# Patient Record
Sex: MALE | Race: WHITE | NOT HISPANIC OR LATINO | RURAL
[De-identification: names, ages, dates, MRNs, and addresses within clinical notes are randomized per-mention and may not be internally consistent; named-entity substitution may affect disease eponyms.]

---

## 2022-03-08 ENCOUNTER — OFFICE VISIT (OUTPATIENT)
Dept: RURAL CLINIC 6 | Facility: CLINIC | Age: 62
End: 2022-03-08
Payer: MEDICARE

## 2022-03-08 ENCOUNTER — WEB ENCOUNTER (OUTPATIENT)
Dept: RURAL CLINIC 6 | Facility: CLINIC | Age: 62
End: 2022-03-08

## 2022-03-08 DIAGNOSIS — R74.8 ABNORMAL LIVER ENZYMES: ICD-10-CM

## 2022-03-08 DIAGNOSIS — R16.0 HEPATOMEGALY: ICD-10-CM

## 2022-03-08 PROCEDURE — 99204 OFFICE O/P NEW MOD 45 MIN: CPT | Performed by: INTERNAL MEDICINE

## 2022-03-08 RX ORDER — BESIFLOXACIN 6 MG/ML
1 DROP INTO AFFECTED EYE SUSPENSION OPHTHALMIC THREE TIMES A DAY
Status: DISCONTINUED | COMMUNITY

## 2022-03-08 RX ORDER — OMEPRAZOLE 20 MG/1
1 CAPSULE 30 MINUTES BEFORE MORNING MEAL CAPSULE, DELAYED RELEASE ORAL ONCE A DAY
Status: ACTIVE | COMMUNITY

## 2022-03-08 RX ORDER — PREDNISOLONE ACETATE 10 MG/ML
1 DROP INTO AFFECTED EYE SUSPENSION/ DROPS OPHTHALMIC TWICE A DAY
Status: ACTIVE | COMMUNITY

## 2022-03-08 RX ORDER — GABAPENTIN 300 MG/1
1 CAPSULE CAPSULE ORAL ONCE A DAY
Status: ACTIVE | COMMUNITY

## 2022-03-08 RX ORDER — AMLODIPINE BESYLATE 10 MG/1
1 TABLET TABLET ORAL ONCE A DAY
Status: ACTIVE | COMMUNITY

## 2022-03-08 RX ORDER — NALOXONE HYDROCHLORIDE 4 MG/.1ML
AS DIRECTED SPRAY NASAL
Status: DISCONTINUED | COMMUNITY

## 2022-03-08 RX ORDER — OXYCODONE HYDROCHLORIDE AND ACETAMINOPHEN 5; 325 MG/1; MG/1
1 TABLET AS NEEDED TABLET ORAL
Status: DISCONTINUED | COMMUNITY

## 2022-03-08 RX ORDER — OLMESARTAN MEDOXOMIL 40 MG/1
1 TABLET TABLET, FILM COATED ORAL ONCE A DAY
Status: ACTIVE | COMMUNITY

## 2022-03-08 NOTE — HPI-TODAY'S VISIT:
This is a 61-year-old male referred for abnormal liver profile.  He was aware of abnormal liver profile about a year and a half ago for the first time.  This is consisted of an AST greater than ALT and mildly elevated alkaline phosphatase and a bilirubin of 2.1 as well as ultrasound showing a trace of ascites and heterogeneous appearance of the liver.  He has no history of blood transfusions or blood products exposure to hepatitis needlesticks or tattoos.  He has not been around anyone with chronic viral hepatitis.  No family members have chronic hepatic disease.  He has consumed alcohol fairly heavily for many years.  He currently estimates drinking 3 vodka drinks daily and occasionally bloody Arielle in the morning.  He has no peripheral edema and no history of heart disease.  Hx chronic foot pain/narcotics   Hx esophageal stricture dilation 2020, no varices seen, no dysphagia now.

## 2022-03-08 NOTE — PHYSICAL EXAM GASTROINTESTINAL
Abdomen , soft, nontender, nondistended , no guarding or rigidity , no masses palpable , normal bowel sounds , Liver and Spleen , firm hepatomegaly, nodular , 10 cm below RCM , liver nontender , spleen not palpable . Prominent periumbilical veins. No ascites.

## 2022-05-03 ENCOUNTER — OFFICE VISIT (OUTPATIENT)
Dept: RURAL CLINIC 6 | Facility: CLINIC | Age: 62
End: 2022-05-03
Payer: MEDICARE

## 2022-05-03 DIAGNOSIS — R74.8 ABNORMAL LIVER ENZYMES: ICD-10-CM

## 2022-05-03 DIAGNOSIS — R16.0 HEPATOMEGALY: ICD-10-CM

## 2022-05-03 DIAGNOSIS — K74.60 HEPATIC CIRRHOSIS, UNSPECIFIED HEPATIC CIRRHOSIS TYPE, UNSPECIFIED WHETHER ASCITES PRESENT: ICD-10-CM

## 2022-05-03 PROBLEM — 166643006: Status: ACTIVE | Noted: 2022-03-08

## 2022-05-03 PROBLEM — 80515008: Status: ACTIVE | Noted: 2022-03-08

## 2022-05-03 PROCEDURE — 99213 OFFICE O/P EST LOW 20 MIN: CPT | Performed by: INTERNAL MEDICINE

## 2022-05-03 RX ORDER — OLMESARTAN MEDOXOMIL 40 MG/1
1 TABLET TABLET, FILM COATED ORAL ONCE A DAY
Status: ACTIVE | COMMUNITY

## 2022-05-03 RX ORDER — OXYCODONE HYDROCHLORIDE 5 MG/1
1 TABLET AS NEEDED TABLET ORAL
Status: ACTIVE | COMMUNITY

## 2022-05-03 RX ORDER — GABAPENTIN 300 MG/1
1 CAPSULE CAPSULE ORAL ONCE A DAY
Status: ACTIVE | COMMUNITY

## 2022-05-03 RX ORDER — AMLODIPINE BESYLATE 10 MG/1
1 TABLET TABLET ORAL ONCE A DAY
Status: ACTIVE | COMMUNITY

## 2022-05-03 RX ORDER — OMEPRAZOLE 20 MG/1
1 CAPSULE 30 MINUTES BEFORE MORNING MEAL CAPSULE, DELAYED RELEASE ORAL ONCE A DAY
Status: ACTIVE | COMMUNITY

## 2022-05-03 RX ORDER — PREDNISOLONE ACETATE 10 MG/ML
1 DROP INTO AFFECTED EYE SUSPENSION/ DROPS OPHTHALMIC TWICE A DAY
Status: DISCONTINUED | COMMUNITY

## 2022-05-03 NOTE — HPI-TODAY'S VISIT:
This is a 62-year-old male seen in follow-up of hepatomegaly and chronic liver disease.  He had been consuming alcohol on a daily basis but is not drank any alcohol in the last several months after our last visit.  His prior ultrasound revealed changes suggestive of cirrhosis and a small amount of ascites.  His FibroSure revealed F4 fibrosis and steatosis.  Labs were negative for hemochromatosis Froylan disease hepatitis B or hepatitis C.  He has immunity to hepatitis B with HBsAb positive.  He feels well with no specific complaints today.

## 2022-05-03 NOTE — PHYSICAL EXAM GASTROINTESTINAL
Abdomen , soft, nontender, nondistended , no guarding or rigidity , no masses palpable , normal bowel sounds , Liver and Spleen , firm hepatomegaly present 6 cm below RCM , no splenomegaly , liver nontender , spleen not palpable

## 2022-08-24 LAB
A/G RATIO: 1.6
AFP, SERUM, TUMOR MARKER: 4.8
ALBUMIN: 4.5
ALKALINE PHOSPHATASE: 500
ALT (SGPT): 25
AST (SGOT): 55
BASO (ABSOLUTE): 0.2
BASOS: 4
BILIRUBIN, TOTAL: 1.5
BUN/CREATININE RATIO: 15
BUN: 18
CALCIUM: 9.7
CARBON DIOXIDE, TOTAL: 19
CHLORIDE: 100
CREATININE: 1.24
EGFR: 65
EOS (ABSOLUTE): 0.2
EOS: 4
GLOBULIN, TOTAL: 2.9
GLUCOSE: 101
HEMATOCRIT: 40.9
HEMATOLOGY COMMENTS:: (no result)
HEMOGLOBIN: 13.9
IMMATURE CELLS: (no result)
IMMATURE GRANS (ABS): 0
IMMATURE GRANULOCYTES: 0
INR: 1.1
LYMPHS (ABSOLUTE): 2
LYMPHS: 35
MCH: 34.2
MCHC: 34
MCV: 101
MONOCYTES(ABSOLUTE): 0.6
MONOCYTES: 11
NEUTROPHILS (ABSOLUTE): 2.7
NEUTROPHILS: 46
NRBC: (no result)
PLATELETS: 149
POTASSIUM: 4.4
PROTEIN, TOTAL: 7.4
PROTHROMBIN TIME: 11.1
RBC: 4.07
RDW: 13
SODIUM: 135
WBC: 5.7

## 2022-08-25 LAB
HEP A AB, TOTAL: NEGATIVE
WRITTEN AUTHORIZATION: (no result)

## 2022-09-03 ENCOUNTER — LAB OUTSIDE AN ENCOUNTER (OUTPATIENT)
Dept: RURAL CLINIC 6 | Facility: CLINIC | Age: 62
End: 2022-09-03

## 2022-09-06 ENCOUNTER — LAB OUTSIDE AN ENCOUNTER (OUTPATIENT)
Dept: RURAL CLINIC 6 | Facility: CLINIC | Age: 62
End: 2022-09-06

## 2022-09-06 ENCOUNTER — OFFICE VISIT (OUTPATIENT)
Dept: RURAL CLINIC 6 | Facility: CLINIC | Age: 62
End: 2022-09-06
Payer: MEDICARE

## 2022-09-06 VITALS
WEIGHT: 174 LBS | HEIGHT: 72 IN | BODY MASS INDEX: 23.57 KG/M2 | TEMPERATURE: 98 F | DIASTOLIC BLOOD PRESSURE: 85 MMHG | HEART RATE: 98 BPM | SYSTOLIC BLOOD PRESSURE: 142 MMHG

## 2022-09-06 DIAGNOSIS — K74.60 HEPATIC CIRRHOSIS, UNSPECIFIED HEPATIC CIRRHOSIS TYPE, UNSPECIFIED WHETHER ASCITES PRESENT: ICD-10-CM

## 2022-09-06 PROBLEM — 19943007: Status: ACTIVE | Noted: 2022-05-03

## 2022-09-06 PROCEDURE — 99214 OFFICE O/P EST MOD 30 MIN: CPT | Performed by: INTERNAL MEDICINE

## 2022-09-06 RX ORDER — OLMESARTAN MEDOXOMIL 40 MG/1
1 TABLET TABLET, FILM COATED ORAL ONCE A DAY
Status: ACTIVE | COMMUNITY

## 2022-09-06 RX ORDER — AMLODIPINE BESYLATE 10 MG/1
1 TABLET TABLET ORAL ONCE A DAY
Status: ACTIVE | COMMUNITY

## 2022-09-06 RX ORDER — OXYCODONE HYDROCHLORIDE 5 MG/1
1 TABLET AS NEEDED TABLET ORAL
Status: ACTIVE | COMMUNITY

## 2022-09-06 RX ORDER — GABAPENTIN 300 MG/1
1 CAPSULE CAPSULE ORAL ONCE A DAY
Status: ACTIVE | COMMUNITY

## 2022-09-06 RX ORDER — OMEPRAZOLE 20 MG/1
1 CAPSULE 30 MINUTES BEFORE MORNING MEAL CAPSULE, DELAYED RELEASE ORAL ONCE A DAY
Status: ACTIVE | COMMUNITY

## 2022-09-06 NOTE — HPI-TODAY'S VISIT:
CompensatedCirrhosis, recent labs with alkaline phosphatase of 500 bilirubin 1.6 otherwise normal.  Platelets 150,000.  Last ultrasound with cirrhosis but no masses.  Last alpha-fetoprotein 2 weeks ago was normal.  Upper endoscopy 2020 with no varices.  No other ongoing health issues.  He is on amlodipine which rarely may cause cholestasis and may be contributing to the elevated alkaline phosphatase.  And stable with no ongoing waiting issues.  He remains abstinent.

## 2022-09-06 NOTE — PHYSICAL EXAM GASTROINTESTINAL
Abdomen , soft, nontender, nondistended , no guarding or rigidity , no masses palpable , normal bowel sounds , Liver and Spleen, firm hepatomegaly 10 cm below RCM and xiphoid , liver nontender. No ascites, no palpable spleen, no edema, asterixis or spider angiomata.

## 2023-01-01 ENCOUNTER — LAB OUTSIDE AN ENCOUNTER (OUTPATIENT)
Dept: RURAL CLINIC 6 | Facility: CLINIC | Age: 63
End: 2023-01-01

## 2023-01-01 ENCOUNTER — LAB OUTSIDE AN ENCOUNTER (OUTPATIENT)
Dept: URBAN - METROPOLITAN AREA CLINIC 54 | Facility: CLINIC | Age: 63
End: 2023-01-01

## 2023-01-01 ENCOUNTER — DASHBOARD ENCOUNTERS (OUTPATIENT)
Age: 63
End: 2023-01-01

## 2023-01-01 ENCOUNTER — OFFICE VISIT (OUTPATIENT)
Dept: URBAN - METROPOLITAN AREA CLINIC 54 | Facility: CLINIC | Age: 63
End: 2023-01-01

## 2023-01-01 ENCOUNTER — OFFICE VISIT (OUTPATIENT)
Dept: RURAL CLINIC 6 | Facility: CLINIC | Age: 63
End: 2023-01-01
Payer: MEDICARE

## 2023-01-01 ENCOUNTER — TELEPHONE ENCOUNTER (OUTPATIENT)
Dept: URBAN - METROPOLITAN AREA CLINIC 35 | Facility: CLINIC | Age: 63
End: 2023-01-01

## 2023-01-01 ENCOUNTER — TELEPHONE ENCOUNTER (OUTPATIENT)
Dept: URBAN - METROPOLITAN AREA CLINIC 92 | Facility: CLINIC | Age: 63
End: 2023-01-01

## 2023-01-01 ENCOUNTER — TELEPHONE ENCOUNTER (OUTPATIENT)
Dept: URBAN - METROPOLITAN AREA CLINIC 54 | Facility: CLINIC | Age: 63
End: 2023-01-01

## 2023-01-01 ENCOUNTER — OFFICE VISIT (OUTPATIENT)
Dept: URBAN - METROPOLITAN AREA TELEHEALTH 2 | Facility: TELEHEALTH | Age: 63
End: 2023-01-01

## 2023-01-01 ENCOUNTER — OFFICE VISIT (OUTPATIENT)
Dept: URBAN - METROPOLITAN AREA MEDICAL CENTER 23 | Facility: MEDICAL CENTER | Age: 63
End: 2023-01-01
Payer: MEDICARE

## 2023-01-01 ENCOUNTER — TELEPHONE ENCOUNTER (OUTPATIENT)
Dept: RURAL CLINIC 2 | Facility: CLINIC | Age: 63
End: 2023-01-01

## 2023-01-01 ENCOUNTER — TELEPHONE ENCOUNTER (OUTPATIENT)
Dept: RURAL CLINIC 6 | Facility: CLINIC | Age: 63
End: 2023-01-01

## 2023-01-01 VITALS
HEART RATE: 107 BPM | DIASTOLIC BLOOD PRESSURE: 78 MMHG | BODY MASS INDEX: 25.14 KG/M2 | WEIGHT: 185.6 LBS | SYSTOLIC BLOOD PRESSURE: 130 MMHG | HEIGHT: 72 IN | TEMPERATURE: 98.2 F

## 2023-01-01 DIAGNOSIS — K74.60 HEPATIC CIRRHOSIS, UNSPECIFIED HEPATIC CIRRHOSIS TYPE, UNSPECIFIED WHETHER ASCITES PRESENT: ICD-10-CM

## 2023-01-01 DIAGNOSIS — K74.60 ADVANCED CIRRHOSIS: ICD-10-CM

## 2023-01-01 DIAGNOSIS — R18.8 OTHER ASCITES: ICD-10-CM

## 2023-01-01 DIAGNOSIS — K22.2 ACQUIRED ESOPHAGEAL RING: ICD-10-CM

## 2023-01-01 PROCEDURE — 99213 OFFICE O/P EST LOW 20 MIN: CPT | Performed by: INTERNAL MEDICINE

## 2023-01-01 PROCEDURE — 43248 EGD GUIDE WIRE INSERTION: CPT | Performed by: INTERNAL MEDICINE

## 2023-01-01 RX ORDER — GABAPENTIN 300 MG/1
1 CAPSULE CAPSULE ORAL ONCE A DAY
Status: ACTIVE | COMMUNITY

## 2023-01-01 RX ORDER — OLMESARTAN MEDOXOMIL 40 MG/1
1 TABLET TABLET, FILM COATED ORAL ONCE A DAY
Status: ACTIVE | COMMUNITY

## 2023-01-01 RX ORDER — OXYCODONE HYDROCHLORIDE 5 MG/1
1 TABLET AS NEEDED TABLET ORAL
COMMUNITY

## 2023-01-01 RX ORDER — OLMESARTAN MEDOXOMIL 40 MG/1
1 TABLET TABLET, FILM COATED ORAL ONCE A DAY
COMMUNITY

## 2023-01-01 RX ORDER — SPIRONOLACTONE 25 MG/1
1 TABLET TABLET, FILM COATED ORAL ONCE DAILY
Qty: 30 | Refills: 3 | COMMUNITY
Start: 2023-01-01 | End: 2023-01-01

## 2023-01-01 RX ORDER — OXYCODONE HYDROCHLORIDE 5 MG/1
1 TABLET AS NEEDED TABLET ORAL
Status: ACTIVE | COMMUNITY

## 2023-01-01 RX ORDER — GABAPENTIN 300 MG/1
1 CAPSULE CAPSULE ORAL ONCE A DAY
COMMUNITY

## 2023-01-01 RX ORDER — FUROSEMIDE 20 MG/1
1 TABLET TABLET ORAL ONCE A DAY
Qty: 30 | Refills: 3 | OUTPATIENT
Start: 2023-01-01

## 2023-01-01 RX ORDER — SPIRONOLACTONE 25 MG/1
1 TABLET TABLET, FILM COATED ORAL ONCE DAILY
Qty: 30 | Refills: 3
Start: 2023-01-01 | End: 2024-03-01

## 2023-01-01 RX ORDER — AMLODIPINE BESYLATE 10 MG/1
1 TABLET TABLET ORAL ONCE A DAY
Status: ACTIVE | COMMUNITY

## 2023-01-01 RX ORDER — SPIRONOLACTONE 25 MG/1
1 TABLET TABLET, FILM COATED ORAL ONCE DAILY
Qty: 30 | Refills: 3 | OUTPATIENT
Start: 2023-01-01 | End: 2023-01-01

## 2023-01-01 RX ORDER — OMEPRAZOLE 20 MG/1
1 CAPSULE 30 MINUTES BEFORE MORNING MEAL CAPSULE, DELAYED RELEASE ORAL ONCE A DAY
Status: ACTIVE | COMMUNITY

## 2023-01-01 RX ORDER — FUROSEMIDE 20 MG/1
1 TABLET TABLET ORAL ONCE A DAY
Qty: 30 | Refills: 3 | Status: ACTIVE | COMMUNITY
Start: 2023-01-01

## 2023-01-01 RX ORDER — SPIRONOLACTONE 25 MG/1
1 TABLET TABLET, FILM COATED ORAL ONCE DAILY
Qty: 30 | Refills: 3 | Status: ACTIVE | COMMUNITY
Start: 2023-01-01 | End: 2023-01-01

## 2023-01-01 RX ORDER — OMEPRAZOLE 20 MG/1
1 CAPSULE 30 MINUTES BEFORE MORNING MEAL CAPSULE, DELAYED RELEASE ORAL ONCE A DAY
COMMUNITY

## 2023-01-01 RX ORDER — FUROSEMIDE 20 MG/1
1 TABLET TABLET ORAL ONCE A DAY
Qty: 30 | Refills: 3
Start: 2023-01-01

## 2023-01-01 RX ORDER — FUROSEMIDE 20 MG/1
1 TABLET TABLET ORAL ONCE A DAY
Qty: 30 | Refills: 3 | COMMUNITY
Start: 2023-01-01

## 2023-01-01 RX ORDER — AMLODIPINE BESYLATE 10 MG/1
1 TABLET TABLET ORAL ONCE A DAY
COMMUNITY

## 2023-04-18 PROBLEM — 389026000: Status: ACTIVE | Noted: 2023-01-01

## 2023-04-18 NOTE — HPI-TODAY'S VISIT:
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 normal treatable forms for liver disease assessment.  Over the last 2 months he has developed increasing abdominal girth.  His recent abdominal ultrasound revealed cirrhosis without masses and ascites.  He feels the abdomen is gotten fairly tight.  He is not on diuretics but is on medications for blood pressure including a calcium channel blocker.  He does not strictly restrict sodium intake.  Has no cardiac or respiratory symptoms.  He has no GI bleeding.  He does have a history of dysphagia in the past with dilations of the esophagus but currently no dysphagia.  His last upper endoscopy was about 2 years ago.  His last colonoscopy was 2015.  His weight is increased 20 pounds.  Has had no fever.  He does complain of fatigue.   >> last visit This is a 62-year-old male seen in follow-up of hepatomegaly and chronic liver disease.  He had been consuming alcohol on a daily basis but is not drank any alcohol in the last several months after our last visit.  His prior ultrasound revealed changes suggestive of cirrhosis and a small amount of ascites.  His FibroSure revealed F4 fibrosis and steatosis.  Labs were negative for hemochromatosis Froylan disease hepatitis B or hepatitis C.  He has immunity to hepatitis B with HBsAb positive.  He feels well with no specific complaints today.   1st visit>> This is a 61-year-old male referred for abnormal liver profile.  He was aware of abnormal liver profile about a year and a half ago for the first time.  This is consisted of an AST greater than ALT and mildly elevated alkaline phosphatase and a bilirubin of 2.1 as well as ultrasound showing a trace of ascites and heterogeneous appearance of the liver.  He has no history of blood transfusions or blood products exposure to hepatitis needlesticks or tattoos.  He has not been around anyone with chronic viral hepatitis.  No family members have chronic hepatic disease.  He has consumed alcohol fairly heavily for many years.  He currently estimates drinking 3 vodka drinks daily and occasionally bloody Arielle in the morning.  He has no peripheral edema and no history of heart disease.  Hx chronic foot pain/narcotics   Hx esophageal stricture dilation 2020, no varices seen, no dysphagia now.